# Patient Record
Sex: MALE | URBAN - METROPOLITAN AREA
[De-identification: names, ages, dates, MRNs, and addresses within clinical notes are randomized per-mention and may not be internally consistent; named-entity substitution may affect disease eponyms.]

---

## 2023-05-05 ENCOUNTER — POST-OPERATIVE VISIT (OUTPATIENT)
Facility: LOCATION | Age: 38
End: 2023-05-05

## 2023-05-05 DIAGNOSIS — Z48.810 ENCOUNTER FOR SURGICAL AFTERCARE FOLLOWING SURGERY ON A SENSE ORGAN: Primary | ICD-10-CM

## 2023-05-05 PROCEDURE — 99024 POSTOP FOLLOW-UP VISIT: CPT | Performed by: OPTOMETRIST

## 2023-05-05 RX ORDER — OMEGA-3/DHA/EPA/FISH OIL 1000 MG
CAPSULE ORAL
Qty: 0 | Refills: 0 | Status: ACTIVE
Start: 2023-05-05

## 2023-05-05 ASSESSMENT — KERATOMETRY
OD: 40.81
OS: 40.56

## 2023-05-05 ASSESSMENT — VISUAL ACUITY
OD: 20/40
OS: 20/30

## 2023-05-05 NOTE — IMPRESSION/PLAN
Impression: S/P ID lasik OU - 253 Days. Encounter for surgical aftercare following surgery on a sense organ  Z48.810. 
s/p LASIK OU
OD: amblyopia OU: tr spk OU: pt having difficulty at near Plan: pt to increase art tears to qid, pt to continue omega 3 oils, pt to start christopher leny qhs (rx released)
pt to rtc 1 month. check va od os ou, ks, mr, and exam
reviewed with pt that d/t amblyopia OD we are limited in what can be done to improve near vision. once stable consider cl trial OD to improve near tasks, if pt doesnt accept pt to continue wearing otc readers. RTC PRN decrease VA, increase pain, redness, discharge, photophobia, flashes/floaters or other vision problems.

## 2023-06-19 ENCOUNTER — POST-OPERATIVE VISIT (OUTPATIENT)
Facility: LOCATION | Age: 38
End: 2023-06-19

## 2023-06-19 DIAGNOSIS — Z48.810 ENCOUNTER FOR SURGICAL AFTERCARE FOLLOWING SURGERY ON A SENSE ORGAN: Primary | ICD-10-CM

## 2023-06-19 PROCEDURE — 99024 POSTOP FOLLOW-UP VISIT: CPT | Performed by: OPTOMETRIST

## 2023-06-19 ASSESSMENT — VISUAL ACUITY
OD: 20/40
OS: 20/25

## 2023-06-19 ASSESSMENT — KERATOMETRY: OD: 41.62

## 2023-06-19 NOTE — IMPRESSION/PLAN
Impression: S/P ID lasik OU - 298 Days. Encounter for surgical aftercare following surgery on a sense organ  Z48.810. 
s/p LASIK OU doing well. pt wishes better near vision OD: amblyopia Plan: Continue artificial tears. monovision cl trial OD done today. good initial success. pt to wear x 1 week. rtc check va od os ou dist int and near, clor scor. If pt likes d/c cl and rtc for lvc enh to improve near vision OD. RTC PRN decrease VA, increase pain, redness, discharge, photophobia, flashes/floaters or other vision problems.